# Patient Record
Sex: FEMALE | Race: WHITE
[De-identification: names, ages, dates, MRNs, and addresses within clinical notes are randomized per-mention and may not be internally consistent; named-entity substitution may affect disease eponyms.]

---

## 2018-04-01 VITALS — SYSTOLIC BLOOD PRESSURE: 127 MMHG | DIASTOLIC BLOOD PRESSURE: 68 MMHG

## 2018-04-02 ENCOUNTER — HOSPITAL ENCOUNTER (INPATIENT)
Dept: HOSPITAL 96 - M.ERS | Age: 58
LOS: 4 days | Discharge: HOME HEALTH | DRG: 871 | End: 2018-04-06
Attending: INTERNAL MEDICINE | Admitting: INTERNAL MEDICINE
Payer: COMMERCIAL

## 2018-04-02 VITALS — SYSTOLIC BLOOD PRESSURE: 101 MMHG | DIASTOLIC BLOOD PRESSURE: 50 MMHG

## 2018-04-02 VITALS — DIASTOLIC BLOOD PRESSURE: 47 MMHG | SYSTOLIC BLOOD PRESSURE: 86 MMHG

## 2018-04-02 VITALS — SYSTOLIC BLOOD PRESSURE: 89 MMHG | DIASTOLIC BLOOD PRESSURE: 34 MMHG

## 2018-04-02 VITALS — BODY MASS INDEX: 41.95 KG/M2 | HEIGHT: 70 IN | WEIGHT: 293 LBS

## 2018-04-02 VITALS — SYSTOLIC BLOOD PRESSURE: 124 MMHG | DIASTOLIC BLOOD PRESSURE: 60 MMHG

## 2018-04-02 VITALS — DIASTOLIC BLOOD PRESSURE: 68 MMHG | SYSTOLIC BLOOD PRESSURE: 127 MMHG

## 2018-04-02 VITALS — DIASTOLIC BLOOD PRESSURE: 87 MMHG | SYSTOLIC BLOOD PRESSURE: 113 MMHG

## 2018-04-02 DIAGNOSIS — N39.0: ICD-10-CM

## 2018-04-02 DIAGNOSIS — M25.562: ICD-10-CM

## 2018-04-02 DIAGNOSIS — R33.9: ICD-10-CM

## 2018-04-02 DIAGNOSIS — E87.1: ICD-10-CM

## 2018-04-02 DIAGNOSIS — E03.9: ICD-10-CM

## 2018-04-02 DIAGNOSIS — K75.9: ICD-10-CM

## 2018-04-02 DIAGNOSIS — A41.9: Primary | ICD-10-CM

## 2018-04-02 DIAGNOSIS — Z91.048: ICD-10-CM

## 2018-04-02 DIAGNOSIS — Z98.84: ICD-10-CM

## 2018-04-02 DIAGNOSIS — E11.22: ICD-10-CM

## 2018-04-02 DIAGNOSIS — I12.0: ICD-10-CM

## 2018-04-02 DIAGNOSIS — M79.7: ICD-10-CM

## 2018-04-02 DIAGNOSIS — Z79.899: ICD-10-CM

## 2018-04-02 DIAGNOSIS — E87.6: ICD-10-CM

## 2018-04-02 DIAGNOSIS — Z88.2: ICD-10-CM

## 2018-04-02 DIAGNOSIS — E11.65: ICD-10-CM

## 2018-04-02 DIAGNOSIS — N18.5: ICD-10-CM

## 2018-04-02 DIAGNOSIS — E66.01: ICD-10-CM

## 2018-04-02 DIAGNOSIS — Z79.82: ICD-10-CM

## 2018-04-02 DIAGNOSIS — G47.33: ICD-10-CM

## 2018-04-02 DIAGNOSIS — E78.5: ICD-10-CM

## 2018-04-02 DIAGNOSIS — F31.9: ICD-10-CM

## 2018-04-02 DIAGNOSIS — J18.9: ICD-10-CM

## 2018-04-02 DIAGNOSIS — D64.9: ICD-10-CM

## 2018-04-02 DIAGNOSIS — I42.9: ICD-10-CM

## 2018-04-02 DIAGNOSIS — F41.9: ICD-10-CM

## 2018-04-02 DIAGNOSIS — N17.0: ICD-10-CM

## 2018-04-02 DIAGNOSIS — J45.909: ICD-10-CM

## 2018-04-02 DIAGNOSIS — E44.0: ICD-10-CM

## 2018-04-02 LAB
%HYPO/RBC NFR BLD AUTO: (no result) %
ABSOLUTE MONOCYTES: 0.7 THOU/UL (ref 0–1.2)
ALBUMIN SERPL-MCNC: 2.3 G/DL (ref 3.4–5)
ALP SERPL-CCNC: 66 U/L (ref 46–116)
ALT SERPL-CCNC: 50 U/L (ref 30–65)
ANION GAP SERPL CALC-SCNC: 12 MMOL/L (ref 7–16)
ANISOCYTOSIS BLD QL SMEAR: (no result)
APTT BLD: 33.9 SECONDS (ref 25–31.3)
AST SERPL-CCNC: 137 U/L (ref 15–37)
BACTERIA-REFLEX: (no result) /HPF
BILIRUB SERPL-MCNC: 0.5 MG/DL
BILIRUB UR-MCNC: NEGATIVE MG/DL
BUN SERPL-MCNC: 48 MG/DL (ref 7–18)
CALCIUM SERPL-MCNC: 8.2 MG/DL (ref 8.5–10.1)
CHLORIDE SERPL-SCNC: 92 MMOL/L (ref 98–107)
CO2 SERPL-SCNC: 26 MMOL/L (ref 21–32)
COLOR UR: YELLOW
CREAT SERPL-MCNC: 3.4 MG/DL (ref 0.6–1.3)
GLUCOSE SERPL-MCNC: 127 MG/DL (ref 70–99)
GRANULOCYTES NFR BLD MANUAL: 92 %
HCT VFR BLD CALC: 31.9 % (ref 37–47)
HGB BLD-MCNC: 10 GM/DL (ref 12–15)
INR PPP: 1.1
KETONES UR STRIP-MCNC: NEGATIVE MG/DL
LYMPHOCYTES # BLD: 0.7 THOU/UL (ref 0.8–5.3)
LYMPHOCYTES NFR BLD AUTO: 4 %
MCH RBC QN AUTO: 22.6 PG (ref 26–34)
MCHC RBC AUTO-ENTMCNC: 31.4 G/DL (ref 28–37)
MCV RBC: 71.9 FL (ref 80–100)
MICROCYTES: (no result)
MONOCYTES NFR BLD: 4 %
MPV: 8.3 FL. (ref 7.2–11.1)
NEUTROPHILS # BLD: 15.4 THOU/UL (ref 1.6–8.1)
NUCLEATED RBCS: 0 /100WBC
PLATELET # BLD EST: ADEQUATE 10*3/UL
PLATELET COUNT*: 224 THOU/UL (ref 150–400)
POTASSIUM SERPL-SCNC: 3.3 MMOL/L (ref 3.5–5.1)
PROT SERPL-MCNC: 6.9 G/DL (ref 6.4–8.2)
PROT UR QL STRIP: (no result)
PROTHROMBIN TIME: 10.4 SECONDS (ref 9.2–11.5)
RBC # BLD AUTO: 4.44 MIL/UL (ref 4.2–5)
RBC # UR STRIP: (no result) /UL
RBC #/AREA URNS HPF: (no result) /HPF (ref 0–2)
RDW-CV: 17.1 % (ref 10.5–14.5)
SODIUM SERPL-SCNC: 130 MMOL/L (ref 136–145)
SP GR UR STRIP: 1.02 (ref 1–1.03)
SQUAMOUS: (no result) /LPF (ref 0–3)
URINE CLARITY: (no result)
URINE GLUCOSE-RANDOM: NEGATIVE
URINE LEUKOCYTES-REFLEX: (no result)
URINE NITRITE-REFLEX: POSITIVE
UROBILINOGEN UR STRIP-ACNC: 0.2 E.U./DL (ref 0.2–1)
WBC # BLD AUTO: 16.7 THOU/UL (ref 4–11)

## 2018-04-02 PROCEDURE — 02H633Z INSERTION OF INFUSION DEVICE INTO RIGHT ATRIUM, PERCUTANEOUS APPROACH: ICD-10-PCS | Performed by: INTERNAL MEDICINE

## 2018-04-02 NOTE — E[place] [address]
Phone:  [phone]                     ELECTROCARDIOGRAM REPORT      
_______________________________________________________________________________
 
Name:       BRITTNEY SLADE                Room:                      [facility]  
M.R.#:  [id number]      Account #:      [id number]  
Admission:  18     Attend Phys:                         
Discharge:               Date of Birth:  [birth year]  
         Report #: [id number]
    [id number]
_______________________________________________________________________________
THIS REPORT FOR:  //name//                      
 
                         [facility] ED
                                       
Test Date:    2018               Test Time:    14:33:10
Pat Name:     BRITTNEY SLADE            Department:   
Patient ID:   [id number]            Room:          
Gender:       F                        Technician:   EVELIO THURMAN
:          1960               Requested By: Twin Chen
Order Number: [id number]    Reading MD:   Gilbert Cortez
                                 Measurements
Intervals                              Axis          
Rate:         121                      P:            
NH:                                    QRS:          17
QRSD:         94                       T:            -6
QT:           343                                    
QTc:          487                                    
                           Interpretive Statements
sinus tachycardia
Borderline T abnormalities, inferior leads
Borderline prolonged QT interval
No previous ECG available for comparison
 
Electronically Signed On 2018 15:31:56 CDT by Gilbert Cortez
[email or web address]
 
 
 
 
 
 
 
 
 
 
 
 
 
 
 
 
 
 
  <ELECTRONICALLY SIGNED>
                                           By: Gilbert Cortez MD, [facility]      
  18     1531
D: 18 1433   _____________________________________
T: 18 1433   Gilbert Cortez MD, FACC        /EPI

## 2018-04-02 NOTE — NUR
PT ARRIVED ON THE UNIT AT 1825. REPORT GIVEN TO SHAHLA OTTO. ASSESSED PT AND
DOCUMENTED. PT IS A&O AND ON 4L OF 02. PT IS ON FALL PRECAUTIONS PER FACILITY
PROTOCOL. PT C/O PAIN RATED A 7 IN HER BACK. BED IS IN LOW POSITION CALL LIGHT
IS IN REACH. FALL CONTRACT AND PT MEDICARE RIGHTS SIGNED.

## 2018-04-02 NOTE — NUR
PT UNABLE TO VOID ON BED PAN. JARAMILLO CATHETER PLACED. 1400 MLS OF DRK CLOUDY
URINE. UA COLLECTED AND SENT TO LAB.

## 2018-04-03 VITALS — DIASTOLIC BLOOD PRESSURE: 46 MMHG | SYSTOLIC BLOOD PRESSURE: 110 MMHG

## 2018-04-03 VITALS — SYSTOLIC BLOOD PRESSURE: 114 MMHG | DIASTOLIC BLOOD PRESSURE: 60 MMHG

## 2018-04-03 VITALS — DIASTOLIC BLOOD PRESSURE: 77 MMHG | SYSTOLIC BLOOD PRESSURE: 212 MMHG

## 2018-04-03 VITALS — SYSTOLIC BLOOD PRESSURE: 127 MMHG | DIASTOLIC BLOOD PRESSURE: 78 MMHG

## 2018-04-03 VITALS — SYSTOLIC BLOOD PRESSURE: 113 MMHG | DIASTOLIC BLOOD PRESSURE: 57 MMHG

## 2018-04-03 VITALS — SYSTOLIC BLOOD PRESSURE: 119 MMHG | DIASTOLIC BLOOD PRESSURE: 64 MMHG

## 2018-04-03 VITALS — SYSTOLIC BLOOD PRESSURE: 121 MMHG | DIASTOLIC BLOOD PRESSURE: 59 MMHG

## 2018-04-03 VITALS — SYSTOLIC BLOOD PRESSURE: 149 MMHG | DIASTOLIC BLOOD PRESSURE: 78 MMHG

## 2018-04-03 VITALS — DIASTOLIC BLOOD PRESSURE: 47 MMHG | SYSTOLIC BLOOD PRESSURE: 107 MMHG

## 2018-04-03 VITALS — SYSTOLIC BLOOD PRESSURE: 102 MMHG | DIASTOLIC BLOOD PRESSURE: 84 MMHG

## 2018-04-03 VITALS — SYSTOLIC BLOOD PRESSURE: 122 MMHG | DIASTOLIC BLOOD PRESSURE: 75 MMHG

## 2018-04-03 VITALS — DIASTOLIC BLOOD PRESSURE: 78 MMHG | SYSTOLIC BLOOD PRESSURE: 154 MMHG

## 2018-04-03 VITALS — DIASTOLIC BLOOD PRESSURE: 58 MMHG | SYSTOLIC BLOOD PRESSURE: 120 MMHG

## 2018-04-03 VITALS — DIASTOLIC BLOOD PRESSURE: 79 MMHG | SYSTOLIC BLOOD PRESSURE: 146 MMHG

## 2018-04-03 VITALS — DIASTOLIC BLOOD PRESSURE: 64 MMHG | SYSTOLIC BLOOD PRESSURE: 111 MMHG

## 2018-04-03 VITALS — DIASTOLIC BLOOD PRESSURE: 62 MMHG | SYSTOLIC BLOOD PRESSURE: 83 MMHG

## 2018-04-03 VITALS — DIASTOLIC BLOOD PRESSURE: 66 MMHG | SYSTOLIC BLOOD PRESSURE: 131 MMHG

## 2018-04-03 VITALS — DIASTOLIC BLOOD PRESSURE: 70 MMHG | SYSTOLIC BLOOD PRESSURE: 121 MMHG

## 2018-04-03 LAB
ABSOLUTE BASOPHILS: 0.1 THOU/UL (ref 0–0.2)
ABSOLUTE EOSINOPHILS: 0 THOU/UL (ref 0–0.7)
ABSOLUTE MONOCYTES: 1.9 THOU/UL (ref 0–1.2)
ALBUMIN SERPL-MCNC: 2.1 G/DL (ref 3.4–5)
ALP SERPL-CCNC: 59 U/L (ref 46–116)
ALT SERPL-CCNC: 49 U/L (ref 30–65)
ANION GAP SERPL CALC-SCNC: 11 MMOL/L (ref 7–16)
AST SERPL-CCNC: 113 U/L (ref 15–37)
BASOPHILS NFR BLD AUTO: 0.3 %
BILIRUB SERPL-MCNC: 0.3 MG/DL
BUN SERPL-MCNC: 46 MG/DL (ref 7–18)
CALCIUM SERPL-MCNC: 7.6 MG/DL (ref 8.5–10.1)
CHLORIDE SERPL-SCNC: 98 MMOL/L (ref 98–107)
CO2 SERPL-SCNC: 26 MMOL/L (ref 21–32)
CREAT SERPL-MCNC: 2.8 MG/DL (ref 0.6–1.3)
EOSINOPHIL NFR BLD: 0.1 %
GLUCOSE SERPL-MCNC: 119 MG/DL (ref 70–99)
GRANULOCYTES NFR BLD MANUAL: 80.9 %
HCT VFR BLD CALC: 28.9 % (ref 37–47)
HGB BLD-MCNC: 9.3 GM/DL (ref 12–15)
LYMPHOCYTES # BLD: 1.1 THOU/UL (ref 0.8–5.3)
LYMPHOCYTES NFR BLD AUTO: 6.8 %
MAGNESIUM SERPL-MCNC: 1.6 MG/DL (ref 1.8–2.4)
MCH RBC QN AUTO: 22.8 PG (ref 26–34)
MCHC RBC AUTO-ENTMCNC: 32 G/DL (ref 28–37)
MCV RBC: 71.3 FL (ref 80–100)
MONOCYTES NFR BLD: 11.9 %
MPV: 8.7 FL. (ref 7.2–11.1)
NEUTROPHILS # BLD: 13.1 THOU/UL (ref 1.6–8.1)
NUCLEATED RBCS: 0 /100WBC
PLATELET COUNT*: 220 THOU/UL (ref 150–400)
POTASSIUM SERPL-SCNC: 3.5 MMOL/L (ref 3.5–5.1)
PROT SERPL-MCNC: 6.5 G/DL (ref 6.4–8.2)
RBC # BLD AUTO: 4.05 MIL/UL (ref 4.2–5)
RDW-CV: 17.1 % (ref 10.5–14.5)
SODIUM SERPL-SCNC: 135 MMOL/L (ref 136–145)
WBC # BLD AUTO: 16.2 THOU/UL (ref 4–11)

## 2018-04-03 NOTE — NUR
Nutrition: Pt admitted with sepsis, pneumonia. H/o DM, HTN, HLD, OBE. Wt:
450#. Ht: 5'10" On CHO controlled diet. Per ICU rounds, pt didn't eat much
BKFST d/t HA. Labs: , alb 2.1, prealb 9. Expect increase in po intake
throughout the day. If po intake remains <50% of meals, will order Boost
Glucose Control for added protein and kcals. Consider Mild risk. Will follow
up on po intake and labs 4/5/18.

## 2018-04-03 NOTE — NUR
CM SPOKE TO THE RN IN-CHARGE OF THE PATIENT AND SHE INFORMS THAT THE PATIENT
HAS COMPLAINED OF A HEADACHE TODAY, BUT REFUSED MEDICATION. PATIENT CURENLTLY
ON 4L O2. PATIENT REFUSED BREAKFAST. CM WILL REMAIN AVAILABLE TO ASSIST AND
FOLLOW AS NEEDED.

## 2018-04-03 NOTE — NUR
PT ALERT ORIENTED. UA + FOR UTI. DR MADISON NOTIFIED VIA YOU CALL MD. NO NEW
ORDERS AT THIS TIME. LEVOPHED AT 5 MCG. TITRATED BETWEEN 4-6 MCG SINCE 1930.
SBP LOW 100S. NS GIVEN TIMES 3 LITERS THEN 1 LITER AT 150MLS/HR. THEN TKO. PT
TURNS SELF. REFUSED SCDS AND BARIATRIC BED.  TELEMETRY SHOWS SR.

## 2018-04-03 NOTE — NUR
PT AOX4 THIS SHIFT
PT MAINTAINING BP POST LEVO GTT BEING STOPPED
PT O2 SATS IN THE 90'S THIS SHIFT ON 4L O2 PER NC
PT HAS JARAMILLO WITH NO COMPLICATIONS AND ADEQUATE OUTPUT THIS SHIFT
PT SKIN INTACT THIS SHIFT, NO GI/ COMPLAINTS THIS SHIFT
PT TOLERATING DIET THIS SHIFT
PT C/O PAIN THIS SHIFT, APPROPRIATELY ASKS FOR PAIN MEDICATIONS.
HOURLY ROUNDING HAS BEEN MAINTAINED THIS SHIFT

## 2018-04-04 VITALS — SYSTOLIC BLOOD PRESSURE: 149 MMHG | DIASTOLIC BLOOD PRESSURE: 67 MMHG

## 2018-04-04 VITALS — SYSTOLIC BLOOD PRESSURE: 120 MMHG | DIASTOLIC BLOOD PRESSURE: 67 MMHG

## 2018-04-04 VITALS — DIASTOLIC BLOOD PRESSURE: 84 MMHG | SYSTOLIC BLOOD PRESSURE: 125 MMHG

## 2018-04-04 VITALS — SYSTOLIC BLOOD PRESSURE: 161 MMHG | DIASTOLIC BLOOD PRESSURE: 60 MMHG

## 2018-04-04 VITALS — DIASTOLIC BLOOD PRESSURE: 67 MMHG | SYSTOLIC BLOOD PRESSURE: 109 MMHG

## 2018-04-04 VITALS — DIASTOLIC BLOOD PRESSURE: 69 MMHG | SYSTOLIC BLOOD PRESSURE: 102 MMHG

## 2018-04-04 VITALS — SYSTOLIC BLOOD PRESSURE: 108 MMHG | DIASTOLIC BLOOD PRESSURE: 70 MMHG

## 2018-04-04 VITALS — SYSTOLIC BLOOD PRESSURE: 103 MMHG | DIASTOLIC BLOOD PRESSURE: 47 MMHG

## 2018-04-04 VITALS — DIASTOLIC BLOOD PRESSURE: 96 MMHG | SYSTOLIC BLOOD PRESSURE: 120 MMHG

## 2018-04-04 VITALS — SYSTOLIC BLOOD PRESSURE: 122 MMHG | DIASTOLIC BLOOD PRESSURE: 68 MMHG

## 2018-04-04 VITALS — DIASTOLIC BLOOD PRESSURE: 59 MMHG | SYSTOLIC BLOOD PRESSURE: 102 MMHG

## 2018-04-04 VITALS — SYSTOLIC BLOOD PRESSURE: 105 MMHG | DIASTOLIC BLOOD PRESSURE: 43 MMHG

## 2018-04-04 VITALS — SYSTOLIC BLOOD PRESSURE: 131 MMHG | DIASTOLIC BLOOD PRESSURE: 66 MMHG

## 2018-04-04 VITALS — DIASTOLIC BLOOD PRESSURE: 66 MMHG | SYSTOLIC BLOOD PRESSURE: 128 MMHG

## 2018-04-04 VITALS — DIASTOLIC BLOOD PRESSURE: 59 MMHG | SYSTOLIC BLOOD PRESSURE: 116 MMHG

## 2018-04-04 VITALS — DIASTOLIC BLOOD PRESSURE: 66 MMHG | SYSTOLIC BLOOD PRESSURE: 131 MMHG

## 2018-04-04 LAB
ABSOLUTE BASOPHILS: 0.1 THOU/UL (ref 0–0.2)
ABSOLUTE EOSINOPHILS: 0.1 THOU/UL (ref 0–0.7)
ABSOLUTE MONOCYTES: 1.4 THOU/UL (ref 0–1.2)
ALBUMIN SERPL-MCNC: 2 G/DL (ref 3.4–5)
ALBUMIN SERPL-MCNC: 2 G/DL (ref 3.4–5)
ALP SERPL-CCNC: 58 U/L (ref 46–116)
ALP SERPL-CCNC: 59 U/L (ref 46–116)
ALT SERPL-CCNC: 56 U/L (ref 30–65)
ALT SERPL-CCNC: 58 U/L (ref 30–65)
ANION GAP SERPL CALC-SCNC: 6 MMOL/L (ref 7–16)
ANION GAP SERPL CALC-SCNC: 7 MMOL/L (ref 7–16)
AST SERPL-CCNC: 90 U/L (ref 15–37)
AST SERPL-CCNC: 91 U/L (ref 15–37)
BASOPHILS NFR BLD AUTO: 1 %
BILIRUB SERPL-MCNC: 0.2 MG/DL
BILIRUB SERPL-MCNC: 0.2 MG/DL
BUN SERPL-MCNC: 28 MG/DL (ref 7–18)
BUN SERPL-MCNC: 29 MG/DL (ref 7–18)
CALCIUM SERPL-MCNC: 8.1 MG/DL (ref 8.5–10.1)
CALCIUM SERPL-MCNC: 8.1 MG/DL (ref 8.5–10.1)
CHLORIDE SERPL-SCNC: 101 MMOL/L (ref 98–107)
CHLORIDE SERPL-SCNC: 101 MMOL/L (ref 98–107)
CO2 SERPL-SCNC: 28 MMOL/L (ref 21–32)
CO2 SERPL-SCNC: 29 MMOL/L (ref 21–32)
CREAT SERPL-MCNC: 1.6 MG/DL (ref 0.6–1.3)
CREAT SERPL-MCNC: 1.6 MG/DL (ref 0.6–1.3)
EOSINOPHIL NFR BLD: 1 %
GLUCOSE SERPL-MCNC: 112 MG/DL (ref 70–99)
GLUCOSE SERPL-MCNC: 116 MG/DL (ref 70–99)
GRANULOCYTES NFR BLD MANUAL: 75.6 %
HCT VFR BLD CALC: 28.3 % (ref 37–47)
HGB BLD-MCNC: 8.9 GM/DL (ref 12–15)
LYMPHOCYTES # BLD: 1.2 THOU/UL (ref 0.8–5.3)
LYMPHOCYTES NFR BLD AUTO: 10.3 %
MCH RBC QN AUTO: 22.6 PG (ref 26–34)
MCHC RBC AUTO-ENTMCNC: 31.4 G/DL (ref 28–37)
MCV RBC: 72.1 FL (ref 80–100)
MONOCYTES NFR BLD: 12.1 %
MPV: 8.3 FL. (ref 7.2–11.1)
NEUTROPHILS # BLD: 8.6 THOU/UL (ref 1.6–8.1)
NUCLEATED RBCS: 0 /100WBC
PLATELET COUNT*: 239 THOU/UL (ref 150–400)
POTASSIUM SERPL-SCNC: 3.8 MMOL/L (ref 3.5–5.1)
POTASSIUM SERPL-SCNC: 3.8 MMOL/L (ref 3.5–5.1)
PROT SERPL-MCNC: 6.6 G/DL (ref 6.4–8.2)
PROT SERPL-MCNC: 6.6 G/DL (ref 6.4–8.2)
RBC # BLD AUTO: 3.93 MIL/UL (ref 4.2–5)
RDW-CV: 17.3 % (ref 10.5–14.5)
SODIUM SERPL-SCNC: 136 MMOL/L (ref 136–145)
SODIUM SERPL-SCNC: 136 MMOL/L (ref 136–145)
WBC # BLD AUTO: 11.4 THOU/UL (ref 4–11)

## 2018-04-04 PROCEDURE — B24BZZ4 ULTRASONOGRAPHY OF HEART WITH AORTA, TRANSESOPHAGEAL: ICD-10-PCS | Performed by: INTERNAL MEDICINE

## 2018-04-04 NOTE — NUR
PT'S HEART RATE INCREASED -150 FOR LESS THAN ONE MINUTE. CALLED DR NARAYANAN AND REPORTED HEART RATE. PT NOT TO BE TRANSFERED OUT OF ICU, CONTINUE TO
MONITOR PER DR NARAYANAN.

## 2018-04-04 NOTE — NUR
INTERDISICPLINARY ROUNDS: MET WITH PT. SHE WAS A/O.  ON O2 AND HAD EPISODE OF
TACHYCARDIA PER NURSING THIS AM. PT STATES SHE LIVES WITH HER COUSIN/ROCKY MORALES. THEY SHARE HOUSEHOLD DUTIES. PT'S SPOUSE IS .  PT HAS A
BROTHER/DAMARIS LOCKHART WHO IS AN  THAT SHE SAYS IS HER 1ST
DPOA. ONLY DPOA ON FILE IS FROM , PT STATES THERE IS A NEWER ONE SINCE
THEN AND SHE WILL HAVE ROCKY BRING IT IN.  PT STATES HER BROTHER DAMARIS LOCKHART
[phone] AND HER COUSIN FRANCISCO JAVIER MORALES [phone] ARE HER DPOAS.  PT IS
INDEPENDENT AT HOME, DRIVES.  HAS WALKER AND ONE WITH A SEAT, CANE AND W/C AT
HOME SHE USES AS NEEDED.  SHE HASN'T HAD HH OR BEEN TO SNF. PT PLANS TO GO
[place].  WILL FOLLOW

## 2018-04-04 NOTE — CON
[address]  [address]                    CONSULTATION                  
_______________________________________________________________________________
 
Name:       YANYBRITTNEY JOVI                Room:           [address] [place]  
M.R.#:  [id number]      Account #:      [id number]  
Admission:  04/02/18     Attend Phys:    INEZ Zuniga
Discharge:               Date of Birth:  [birth year]  
         Report #: [id number]
                                                                     [id number]  
_______________________________________________________________________________
THIS REPORT FOR:  //name//                      
 
CC: Gilbert Lutz
 
DATE OF SERVICE:  04/03/2018
 
 
ATTENDING PHYSICIAN:  Dr. Reno Lutz.
 
REASON FOR EVALUATION:  Sepsis, pneumonia.
 
HISTORY OF PRESENT ILLNESS:  Chart reviewed, patient examined.  This 58-year-old
morbidly obese woman with diabetes mellitus type 2 who apparently had onset of
illness, perhaps 10-11 days ago.  She described progressive dyspnea, not a
significant amount of cough, did have high-grade temperature elevations in the
102-103 range for several days, became weaker and notes some anorexia and poor
p.o. intake.  She was getting out of bed overnight prior to admission, fell on
the floor, dragged herself to the commode; however, was unable to get up.  She
was there for several hours until somebody found her.  She was transferred to
the Emergency Room, is now in the Intensive Care Unit.  She is generally lucid. 
She is on supplemental oxygen and supportive pressor with norepinephrine.  She
has been started on broad-spectrum antimicrobial therapy including levofloxacin
as well as ceftriaxone and azithromycin as well.  Cultures of the urine and
blood are in progress.  She is currently afebrile.
 
ALLERGIES:  SULFA.
 
CURRENT MEDICATIONS:  Include ipratropium and albuterol inhaler, levofloxacin,
gabapentin, aspirin, levothyroxine, p.r.n. analgesics, antiemetics.
 
PAST MEDICAL HISTORY:  In addition to diabetes mellitus type 2, hypertension,
morbid obesity, anxiety, asthma, bipolar disease, depression, fibromyalgia,
hyperlipidemia, hypothyroidism, previous lap band surgery in 2005.
 
SOCIAL HISTORY:  Nonsmoker, no ethanol.
 
FAMILY HISTORY:  Noncontributory.
 
REVIEW OF SYSTEMS:  As above.  No significant abdominal related discomfort.  She
has had some constipation.
 
PHYSICAL EXAMINATION:
GENERAL:  She is somewhat lethargic.  She does arouse.  She makes eye contact. 
She tracks, is in moderate distress.  She has nasal cannula oxygen in place.
VITAL SIGNS:  Temperature 98.5, pulse 100, respirations 17, blood pressure
 
 
 
[place] [address]                    CONSULTATION                  
_______________________________________________________________________________
 
Name:       BRITTNEY SLADE                Room:           [address]#:  [id number]      Account #:      [id number]  
Admission:  04/02/18     Attend Phys:    INEZ Zuniga
Discharge:               Date of Birth:  [birth year]  
         Report #: [id number]
                                                                     [id number]  
_______________________________________________________________________________
110/46, saturation is 93%.
SKIN:  Warm, dry, no rashes.
HEENT:  Unremarkable.
NECK:  Supple.
LUNGS:  Distant, few scattered coarse breath sounds.
HEART:  Distant as well.  Regular, borderline tachycardic.
ABDOMEN:  Obese, soft.  There are no peritoneal signs.
GENITOURINARY:  Deferred.
RECTAL:  Deferred.
 
LABORATORY DATA:  Initial PT 10.4, INR 1.1.  Electrolytes:  Sodium 130,
potassium 3.3, chloride 92, bicarbonate is 26, BUN and creatinine 48 and 3.4,
glucose of 127.  AST of 137, ALT of 50.  Estimated GFR 14.  Albumin 2.3, total
protein of 6.9.  Lactic acid 3.0 initially, repeat was 1.5.  Chest x-ray:  Mild
patchy bibasilar atelectasis, pneumonitis.  CBC:  White count of 16.7, H and H
10.0 and 31.9, platelets of 224.  Influenza antigen was not detected for A or B.
 Urinalysis 16-25 white cells, 1-9 bacteria.  Blood cultures sterile thus far.
 
ASSESSMENT:  Pneumonitis, perhaps secondary bacterial pneumonia in the setting
of a previous viral respiratory tract infection.  It is reasonable to continue
empiric antimicrobials.  We will adjust therapy.  She is certainly at risk for
infectious complications while residing in the hospital as well.  We will have
to monitor expectantly.
 
 
 
 
 
 
 
 
 
 
 
 
 
 
 
 
 
 
 
 
 
<ELECTRONICALLY SIGNED>
                                        By:  Oumar Bridges MD           
04/04/18     1232
D: 04/03/18 1032_______________________________________
T: 04/03/18 1202Jolily Bridges MD              /nt

## 2018-04-04 NOTE — NUR
PT RANG LIGHT AND REPORTED BLADDER PAIN. CATHETER BAG APPEARED FULL. EMPTIED
JARAMILLO BAG AND BLADDER SCAN DONE BY LESLIE MCKEON RN. JARAMILLO IRRIGATED AND HAD
GOOD RETURN. PT REPORTED FEELING RELIEF.

## 2018-04-04 NOTE — NUR
PT WAS NOTED TO HAVE 'S 'S. PT WAS GUIDED THROUGH VAGAL MANEUVERS
WITHOUT ANY CHANGE IN HEART RATE. EKG ORDERED AND PT CONVERTED BACK TO ST WITH
. PT APPEARED TO BE MILDLY AGITATED DURING INCREASED HEART RATE BUT
DENIED CHEST PAIN, SOA, OR ANY OTHER DISCOMFORTS. PT DENIED FEELING ANY
DIFFERENCE IN HEART RATE. PT NOW RESTING IN BED WITH EYES CLOSED, APPEARS TO
BE COMFORTABLE, DENIES ANY NEEDS.

## 2018-04-04 NOTE — EKG
[place] [address]
Phone:  [phone]                     ELECTROCARDIOGRAM REPORT      
_______________________________________________________________________________
 
Name:       BRITTNEY SLADE                Room:           [address]    ADM [place]  
M.R.#:  [id number]      Account #:      [id number]  
Admission:  18     Attend Phys:    INEZ Zuniga
Discharge:               Date of Birth:  [birth year]  
         Report #: [id number]
    [id number]
_______________________________________________________________________________
THIS REPORT FOR:  //name//                      
 
                          [facility]
                                       
Test Date:    2018               Test Time:    09:52:17
Pat Name:     BRITTNEY SLADE            Department:   
Patient ID:   [id number]            Room:         [address]
Gender:       F                        Technician:   
:          1960               Requested By: Siddhartha French
Order Number: [id number]    Anastacia MD:   Gilbert Cortez
                                 Measurements
Intervals                              Axis          
Rate:         104                      P:            39
RI:           170                      QRS:          13
QRSD:         109                      T:            10
QT:           338                                    
QTc:          445                                    
                           Interpretive Statements
Sinus tachycardia
Compared to ECG 2018 14:33:10
T-wave abnormality no longer present
 
Electronically Signed On 2018 10:19:15 CDT by Gilbert Cortez
[email or web address]
 
 
 
 
 
 
 
 
 
 
 
 
 
 
 
 
 
 
 
  <ELECTRONICALLY SIGNED>
                                           By: Gilbert Cortez MD, [facility]      
  18     1019
D: 1852   _____________________________________
T: 18   Gilbert Cortez MD, FACC        /EPI

## 2018-04-04 NOTE — NUR
Nutrition: follow up. Pt is eating better, per ICU rounds. Remains low to mild
nutrition risk. Will follow up per protocol.

## 2018-04-04 NOTE — NUR
at 2130 pt reported chest pain. 12 lead Ekg done. result called to dr Collazo. recieved order for PRN nitro. at 2145 pt reported relief after
resting, no nitro given. pt resting with ou closed at this time.

## 2018-04-04 NOTE — NUR
PT RANG CALL LIGHT FOR ASSISTANCE BACK TO BED. PT ON COMMODE AT THAT TIME.
PT UNABLE TO STAND AND FELL TO KNEES. PT REPORTS PAIN IN LEFT KNEE. PT GIVEN
PRN NORCO. NURSING SUPERVISOR CHERELLE PENA NOTIFIED. DR TOSCANO NOTIFIED.
RECIEVED ORDER FOR XRAY OF LEFT KNEE.

## 2018-04-04 NOTE — 2DMMODE
[place] [address]
Phone:  [phone] 2 D/M-MODE ECHOCARDIOGRAM     
_______________________________________________________________________________
 
Name:         BRITTNEY SLADE               Room:          [address] [place] 
[facility]#:    [id number]     Account #:     [id number]  
Admission:    18    Attend Phys:   Reno Lutz
Discharge:                Date of Birth: [birth year]  
Date of Service: 18 1440  Report #:      [id number]
        [id number]
_______________________________________________________________________________
THIS REPORT FOR:  //name//                      
 
 
--------------- APPROVED REPORT --------------
 
 
Study performed:  2018 10:57:33
 
EXAM: Comprehensive 2D, Doppler, and color-flow 
Echocardiogram 
Patient Location: In-Patient   
Room #:  001     Status:  routine
 
     BSA:         3.03
HR: 101 bpm BP:          87/28 mmHg 
Rhythm: NSR    
 
Other Information 
Technically limited study due to  body habitus.
 
Indications
Dyspnea 
 
Echo Enhancing Agent
Indication: Endocardial border delineation
Agent(s) / Amount(s) Used: Optison 3 cc
 
2D Dimensions
   LVEF(%):  65.79 (&gt;50%)
IVSd:  15.75 (7-11mm) LVOT Diam:  21.73 (18-24mm) 
LVDd:  49.19 mm  
PWd:  15.50 (7-11mm) Ascending Ao:  32.45 (22-36mm)
LVDs:  31.34 (25-40mm) 
Aortic Root:  38.74 mm 
   Abdi's LVEF:  65.79 %
 
Volumes
Left Atrial Volume (Systole) 
    LA ESV Index:  28.90 mL/m2
 
Aortic Valve
AoV Peak Isidro.:  1.67 m/s 
AO Peak Gr.:  11.11 mmHg LVOT Max P.22 mmHg
AO Mean Gr.:  6.45 mmHg  LVOT Mean PG:  3.72 mmHg
    LVOT Max V:  1.34 m/s
AO V2 VTI:  29.73 cm  LVOT Mean V:  0.89 m/s
 
 
[place] [address]
Phone:  [phone]                     2 D/M-MODE ECHOCARDIOGRAM     
_______________________________________________________________________________
 
Name:         BRITTNEY SLADE               Room:          [address] [place] 
[facility].#:    [id number]     Account #:     [id number]  
Admission:    18    Attend Phys:   Reno Lutz
Discharge:                Date of Birth: [birth year]  
Date of Service: 18 1440  Report #:      [id number]
        [id number]
_______________________________________________________________________________
SRAVANI (VTI):  3.02 cm2  LVOT V1 VTI:  24.21 cm
 
Mitral Valve
    E/A Ratio:  1.10
    MV Decel. Time:  186.22 ms
MV E Max Isidro.:  1.24 m/s 
MV PHT:  54.00 ms  
MVA (PHT):  4.07 cm2  
 
TDI
E/Lateral E':  9.54 
   Lateral E' Isidro.:  0.13 m/s
 
Pulmonary Valve
PV Peak Isidro.:  1.30 m/s PV Peak Gr.:  6.81 mmHg
 
Left Ventricle
The left ventricle is normal size. There is normal LV segmental wall 
motion. Moderate concentric left ventricular hypertrophy. Left 
ventricular systolic function is normal. The left ventricular 
ejection fraction is within the normal range. LVEF is 60-65%. The 
left ventricular diastolic function is normal.
 
Right Ventricle
The right ventricle is normal size. The right ventricular systolic 
function is normal.
 
Atria
The left atrium size is normal. The right atrium size is 
normal.
 
Aortic Valve
The aortic valve is normal in structure. No aortic regurgitation is 
present. There is no aortic valvular stenosis.
 
Mitral Valve
The mitral valve is normal in structure. There is no mitral valve 
regurgitation noted. No evidence of mitral valve stenosis.
 
Tricuspid Valve
The tricuspid valve is normal in structure. Unable to assess PA 
pressure. Trace tricuspid regurgitation.
 
Pulmonic Valve
The pulmonary valve is normal in structure. There is no pulmonic 
valvular regurgitation.
 
 
[place] [address]
Phone:  [phone]                     2 D/M-MODE ECHOCARDIOGRAM     
_______________________________________________________________________________
 
Name:         BRITTNEY SLADE               Room:          [address] [place] 
[facility]#:    [id number]     Account #:     [id number]  
Admission:    18    Attend Phys:   Reno Lutz
Discharge:                Date of Birth: [birth year]  
Date of Service: 18 1440  Report #:      [id number]
        [id number]
_______________________________________________________________________________
 
Great Vessels
The aortic root is normal in size. IVC is normal in size and 
collapses with &gt;50% inspiration
 
Pericardium
There is no pericardial effusion.
 
&lt;Conclusion&gt;
LVEF is 60-65%.
Moderate concentric left ventricular hypertrophy.
 
 
 
 
 
 
 
 
 
 
 
 
 
 
 
 
 
 
 
 
 
 
 
 
 
 
 
 
 
 
 
 
 
  <ELECTRONICALLY SIGNED>
                                           By: Gilbert Cortez MD, FACC      
  18     1440
D: 18 144   _____________________________________
T: 18 144   Gilbert Cortez MD, FACC        /INF

## 2018-04-04 NOTE — NUR
RECIEVED REPORT AND ASSUMED CARE OF PT AT 1930. PT SITTING ON BEDSIDE
COMMODE. PT'S O2 OFF, SAT 78%. ATTACHED EXTENSION TUBING AND REAPPLIED O2. SAT
NOW 92%. CHANGED PT'S LINENS ON BED. CALL LIGHT IN REACH, PT INSTRUCTED TO
CALL WHEN GETTING UP.

## 2018-04-04 NOTE — NUR
PT RESTLESS AT TIMES AND BECOMES MILDLY AGITATED AT ALARMS ON UNIT. PT C/O
JARAMILLO AND WANTED IT REMOVED. JARAMILLO REMOVED AND PT ABLE TO URINATE IN BSC. PT
HAS C/O HEADACHE ALL DAY REGARDLESS OF PAIN MED GIVEN. PT CURRENTLY APPEARS
COMFORTABLE, LAYING IN BED WITH EYES CLOSED. PT ABLE TO MAKE NEEDS KNOWN, CALL
LIGHT IN REACH

## 2018-04-05 VITALS — DIASTOLIC BLOOD PRESSURE: 66 MMHG | SYSTOLIC BLOOD PRESSURE: 126 MMHG

## 2018-04-05 VITALS — SYSTOLIC BLOOD PRESSURE: 110 MMHG | DIASTOLIC BLOOD PRESSURE: 56 MMHG

## 2018-04-05 VITALS — DIASTOLIC BLOOD PRESSURE: 59 MMHG | SYSTOLIC BLOOD PRESSURE: 155 MMHG

## 2018-04-05 VITALS — DIASTOLIC BLOOD PRESSURE: 67 MMHG | SYSTOLIC BLOOD PRESSURE: 100 MMHG

## 2018-04-05 VITALS — DIASTOLIC BLOOD PRESSURE: 83 MMHG | SYSTOLIC BLOOD PRESSURE: 148 MMHG

## 2018-04-05 VITALS — SYSTOLIC BLOOD PRESSURE: 127 MMHG | DIASTOLIC BLOOD PRESSURE: 82 MMHG

## 2018-04-05 VITALS — DIASTOLIC BLOOD PRESSURE: 96 MMHG | SYSTOLIC BLOOD PRESSURE: 123 MMHG

## 2018-04-05 VITALS — SYSTOLIC BLOOD PRESSURE: 95 MMHG | DIASTOLIC BLOOD PRESSURE: 54 MMHG

## 2018-04-05 VITALS — SYSTOLIC BLOOD PRESSURE: 105 MMHG | DIASTOLIC BLOOD PRESSURE: 47 MMHG

## 2018-04-05 VITALS — SYSTOLIC BLOOD PRESSURE: 106 MMHG | DIASTOLIC BLOOD PRESSURE: 51 MMHG

## 2018-04-05 VITALS — DIASTOLIC BLOOD PRESSURE: 66 MMHG | SYSTOLIC BLOOD PRESSURE: 115 MMHG

## 2018-04-05 LAB
ABSOLUTE BASOPHILS: 0.1 THOU/UL (ref 0–0.2)
ABSOLUTE EOSINOPHILS: 0.3 THOU/UL (ref 0–0.7)
ABSOLUTE MONOCYTES: 1.1 THOU/UL (ref 0–1.2)
ALBUMIN SERPL-MCNC: 2.1 G/DL (ref 3.4–5)
ALP SERPL-CCNC: 55 U/L (ref 46–116)
ALT SERPL-CCNC: 78 U/L (ref 30–65)
ANION GAP SERPL CALC-SCNC: 6 MMOL/L (ref 7–16)
ANION GAP SERPL CALC-SCNC: 8 MMOL/L (ref 7–16)
AST SERPL-CCNC: 101 U/L (ref 15–37)
BASOPHILS NFR BLD AUTO: 1.1 %
BILIRUB SERPL-MCNC: 0.2 MG/DL
BUN SERPL-MCNC: 23 MG/DL (ref 7–18)
BUN SERPL-MCNC: 25 MG/DL (ref 7–18)
CALCIUM SERPL-MCNC: 8.4 MG/DL (ref 8.5–10.1)
CALCIUM SERPL-MCNC: 8.5 MG/DL (ref 8.5–10.1)
CHLORIDE SERPL-SCNC: 101 MMOL/L (ref 98–107)
CHLORIDE SERPL-SCNC: 101 MMOL/L (ref 98–107)
CO2 SERPL-SCNC: 28 MMOL/L (ref 21–32)
CO2 SERPL-SCNC: 29 MMOL/L (ref 21–32)
CREAT SERPL-MCNC: 1.2 MG/DL (ref 0.6–1.3)
CREAT SERPL-MCNC: 1.5 MG/DL (ref 0.6–1.3)
EOSINOPHIL NFR BLD: 2.7 %
GLUCOSE SERPL-MCNC: 124 MG/DL (ref 70–99)
GLUCOSE SERPL-MCNC: 128 MG/DL (ref 70–99)
GRANULOCYTES NFR BLD MANUAL: 69.5 %
HCT VFR BLD CALC: 29.1 % (ref 37–47)
HGB BLD-MCNC: 8.9 GM/DL (ref 12–15)
LYMPHOCYTES # BLD: 1.6 THOU/UL (ref 0.8–5.3)
LYMPHOCYTES NFR BLD AUTO: 15.7 %
MCH RBC QN AUTO: 22.5 PG (ref 26–34)
MCHC RBC AUTO-ENTMCNC: 30.7 G/DL (ref 28–37)
MCV RBC: 73.4 FL (ref 80–100)
MONOCYTES NFR BLD: 11 %
MPV: 8 FL. (ref 7.2–11.1)
NEUTROPHILS # BLD: 7.1 THOU/UL (ref 1.6–8.1)
NUCLEATED RBCS: 0 /100WBC
PLATELET COUNT*: 290 THOU/UL (ref 150–400)
POTASSIUM SERPL-SCNC: 3.8 MMOL/L (ref 3.5–5.1)
POTASSIUM SERPL-SCNC: 3.8 MMOL/L (ref 3.5–5.1)
PROT SERPL-MCNC: 6.6 G/DL (ref 6.4–8.2)
RBC # BLD AUTO: 3.97 MIL/UL (ref 4.2–5)
RDW-CV: 17.6 % (ref 10.5–14.5)
SODIUM SERPL-SCNC: 136 MMOL/L (ref 136–145)
SODIUM SERPL-SCNC: 137 MMOL/L (ref 136–145)
WBC # BLD AUTO: 10.3 THOU/UL (ref 4–11)

## 2018-04-05 NOTE — CON
[address]  [address]                    CONSULTATION                  
_______________________________________________________________________________
 
Name:       BRITTNEY SLADE JOVI                Room:           [address] [place]  
M.R.#:  [id number]      Account #:      [id number]  
Admission:  04/02/18     Attend Phys:    INEZ Zuniga
Discharge:               Date of Birth:  [birth year]  
         Report #: [id number]
                                                                     [id number]  
_______________________________________________________________________________
THIS REPORT FOR:  //name//                      
 
CC: Gilbert Lutz
 
DATE OF SERVICE:  04/03/2018
 
 
REQUESTING PHYSICIAN:  Reno Lutz DO
 
REASON FOR CONSULTATION:  Acute kidney injury.
 
HISTORY OF PRESENT ILLNESS:  The patient is a 58-year-old white female with
medical history significant for morbid obesity, diabetes mellitus type 2, and
chronic kidney disease stage 3.  She presents to the hospital with complaints of
not feeling well and shortness of breath.  The patient states that the symptoms
started about 10 days ago.  She was seen by Dr. Candelario and she was advised to
go to the Emergency Room; however, she postponed that admission until she got
really worse.  When she came to the hospital, she was diagnosed with pneumonia
and sepsis, and was started on sepsis protocol.  She was hypotensive.  She was
given 4 liters of fluids.  Her creatinine initially was 3.4 and that has
improved to 2.8 today.  She had a chest x-ray done that revealed cardiomegaly,
elevation of the right hemidiaphragm, and mild patchy bibasilar
atelectasis/pneumonitis.
 
Her white count was elevated at 16,300 with 92% segs on the differential.
 
PAST MEDICAL HISTORY:
1.  Morbid obesity.
2.  Diabetes mellitus type 2.
3.  Cardiomyopathy.
4.  Chronic kidney disease stage 3.
 
FAMILY HISTORY:  Noncontributory.
 
SOCIAL HISTORY:  No tobacco or alcohol abuse.
 
MEDICATIONS PRIOR TO ADMISSION:  Reviewed.  From my standpoint, she was on
hydrochlorothiazide 25 mg a day, losartan 50 mg a day, allopurinol 100 mg a day,
and metformin.
 
REVIEW OF SYSTEMS:  Positive for shortness of breath, weakness, and cough.
 
Denies changes in visual acuity or hearing acuity.  Denies being depressed.  She
states her diabetes is controlled.
 
 
 
 
[place] [address]                    CONSULTATION                  
_______________________________________________________________________________
 
Name:       BRITTNEY SLADE                Room:           [address]#:  [id number]      Account #:      [id number]  
Admission:  04/02/18     Attend Phys:    INEZ Zuniga
Discharge:               Date of Birth:  [birth year]  
         Report #: [id number]
                                                                     [id number]  
_______________________________________________________________________________
PHYSICAL EXAMINATION:
GENERAL:  She is awake, alert, and oriented.  She is morbidly obese.
VITAL SIGNS:  Her blood pressure 155/77, but it was as high as 212/71 earlier,
but prior to that on admission, it was as low as 77/41.
HEENT:  Pupils are round.
NECK:  Fatty.
LUNGS:  Decreased air movement.
CARDIOVASCULAR:  No rub.
ABDOMEN:  Very obese.
LOWER EXTREMITIES:  Trace edema.
 
LABORATORY REPORT:  As I mentioned earlier.
 
ASSESSMENT:  A 58-year-old female admitted with respiratory distress, diagnosed
with pneumonitis.  She was hypotensive. She was given fluids and antibiotics. 
She feels better now.  She developed acute kidney injury due to under-perfusion
of the kidney due to the septic process due to pneumonitis.
 
PLAN:  Overall, she is somewhat better.  Renal functions are improving.  She
states that she feels better.  She has good p.o. intake.  She is off of
pressors.  We will continue to monitor.  Continue oral hydration and
antibiotics.
 
Thank you very much for asking my opinion on acute kidney injury on the patient.
 We will follow with you closely.
 
 
 
 
 
 
 
 
 
 
 
 
 
 
 
 
 
 
 
<ELECTRONICALLY SIGNED>
                                        By:  Aaron Calvo MD      
04/05/18     1528
D: 04/03/18 1559_______________________________________
T: 04/04/18 0353Alexsherie Calvo MD         /nt

## 2018-04-05 NOTE — EKG
[place] [address]
Phone:  [phone]                     ELECTROCARDIOGRAM REPORT      
_______________________________________________________________________________
 
Name:       BRITTNEY SLADE                Room:           [address]    ADM IN  
M.R.#:  [id number]      Account #:      [id number]  
Admission:  18     Attend Phys:    INEZ Zuniga
Discharge:               Date of Birth:  [birth year]  
         Report #: [id number]
    [id number]
_______________________________________________________________________________
THIS REPORT FOR:  //name//                      
 
                          [facility]
                                       
Test Date:    2018               Test Time:    21:38:45
Pat Name:     BRITTNEY SLADE            Department:   
Patient ID:   [id number]            Room:         [address]
Gender:       F                        Technician:   MR
:          1960               Requested By: Reno Lutz
Order Number: [id number]    Anastacia MD:   Siddhartha French
                                 Measurements
Intervals                              Axis          
Rate:         91                       P:            54
NJ:           155                      QRS:          18
QRSD:         110                      T:            6
QT:           370                                    
QTc:          456                                    
                           Interpretive Statements
Sinus rhythm
Baseline wander in lead(s) V2,V3
Compared to ECG 2018 09:52:17
Sinus tachycardia no longer present
 
Electronically Signed On 2018 16:35:48 CDT by Siddhartha French
[email or web address]
 
 
 
 
 
 
 
 
 
 
 
 
 
 
 
 
 
 
  <ELECTRONICALLY SIGNED>
                                           By: Siddhartha French MD, [facility]   
  18     1635
D: 18 2138   _____________________________________
T: 18   Siddhartha French MD, [facility]     /EPI

## 2018-04-05 NOTE — NUR
PT RESTLESS AND IMPUSIVE DURING NIGHT. PT FREQUENTLY TAKING OFF ALL MONITOR
LEADS AND SAT MONITOR. PT ALSO REMOVES O2 WHEN RESTLESS. PT ALERT AND ORIENTED
X4 DURING EPISODES AND DOES NOT RING CALL LIGHT WHEN NEEDING ASSISTANCE. PT
PROGRESSING TOWARD GOALS. HEART RATE < 100, BLOOD PRESSURE WITHIN NRMAL
LIMITS. O2 SAT 95 -97% WITH O2 ON.

## 2018-04-05 NOTE — NUR
PT DOING WELL. PT HAS HAD 2 EPISODES OF BLADDER INC IN BED. NOTED THAT PT WSAS
GETTING UP TO BSC WITH SB ASSIT. SISTER IN ROOM PT HAD NEED ASSIST TIMES 1 TO
BSC, PT IS A POOR EATER. IVAB GIVEN EARLIER IN SHIFT. PT XANAX RESUMMED. PT
HAS HAD NO EPISODES OF SVT THIS SHIFT. PT IS PROGRESSING TOWARDS DISCHARGE
GOALS.

## 2018-04-05 NOTE — NUR
PT KEEPS REMOVING TELEMETRY WIRES, O2 SENSOR, AND BP CUFF.PICKED UP SEVERAL
PAIRS OF WET UNDERPANTS OFF FLOOR. PERSONAL BELONGINGS SCATTERED OVER BEDSIDE
TRAY AND FLOOR. PT HAD REMOVED ARM BAND.PT REMINDED NOT TO TAKE OFF NAME BAND
DURING HOSPITAL STAY. PT STATES ALLL THESE NOISES ARE MAKING HER CRAZY. WILL
APPROACH PRIMARY DR TO SEE IF PT MEETS CRITERIA FOR DOWN GRADE TO NEXT LEVEL
OF CARE.

## 2018-04-06 VITALS — SYSTOLIC BLOOD PRESSURE: 121 MMHG | DIASTOLIC BLOOD PRESSURE: 59 MMHG

## 2018-04-06 VITALS — SYSTOLIC BLOOD PRESSURE: 128 MMHG | DIASTOLIC BLOOD PRESSURE: 72 MMHG

## 2018-04-06 VITALS — DIASTOLIC BLOOD PRESSURE: 54 MMHG | SYSTOLIC BLOOD PRESSURE: 128 MMHG

## 2018-04-06 VITALS — SYSTOLIC BLOOD PRESSURE: 128 MMHG | DIASTOLIC BLOOD PRESSURE: 54 MMHG

## 2018-04-06 LAB
%HYPO/RBC NFR BLD AUTO: (no result) %
ABSOLUTE BASOPHILS: 0.1 THOU/UL (ref 0–0.2)
ABSOLUTE EOSINOPHILS: 0.4 THOU/UL (ref 0–0.7)
ABSOLUTE MONOCYTES: 0.5 THOU/UL (ref 0–1.2)
ALBUMIN SERPL-MCNC: 2.1 G/DL (ref 3.4–5)
ALP SERPL-CCNC: 53 U/L (ref 46–116)
ALT SERPL-CCNC: 75 U/L (ref 30–65)
ANION GAP SERPL CALC-SCNC: 7 MMOL/L (ref 7–16)
ANISOCYTOSIS BLD QL SMEAR: (no result)
AST SERPL-CCNC: 76 U/L (ref 15–37)
BASOPHILS NFR BLD AUTO: 1 %
BILIRUB SERPL-MCNC: 0.2 MG/DL
BUN SERPL-MCNC: 18 MG/DL (ref 7–18)
CALCIUM SERPL-MCNC: 8.8 MG/DL (ref 8.5–10.1)
CHLORIDE SERPL-SCNC: 104 MMOL/L (ref 98–107)
CO2 SERPL-SCNC: 31 MMOL/L (ref 21–32)
CREAT SERPL-MCNC: 1.1 MG/DL (ref 0.6–1.3)
EOSINOPHIL NFR BLD: 4 %
GLUCOSE SERPL-MCNC: 106 MG/DL (ref 70–99)
GRANULOCYTES NFR BLD MANUAL: 61 %
HCT VFR BLD CALC: 29.2 % (ref 37–47)
HGB BLD-MCNC: 9.1 GM/DL (ref 12–15)
LYMPHOCYTES # BLD: 2.1 THOU/UL (ref 0.8–5.3)
LYMPHOCYTES NFR BLD AUTO: 19 %
MCH RBC QN AUTO: 22.7 PG (ref 26–34)
MCHC RBC AUTO-ENTMCNC: 31 G/DL (ref 28–37)
MCV RBC: 73.1 FL (ref 80–100)
METAMYELOCYTES NFR BLD: 2 %
MICROCYTES: (no result)
MONOCYTES NFR BLD: 5 %
MPV: 8 FL. (ref 7.2–11.1)
NEUTROPHILS # BLD: 7.7 THOU/UL (ref 1.6–8.1)
NEUTS BAND NFR BLD: 8 %
NUCLEATED RBCS: 0 /100WBC
PLATELET COUNT*: 375 THOU/UL (ref 150–400)
POTASSIUM SERPL-SCNC: 3.8 MMOL/L (ref 3.5–5.1)
PROT SERPL-MCNC: 6.3 G/DL (ref 6.4–8.2)
RBC # BLD AUTO: 3.99 MIL/UL (ref 4.2–5)
RDW-CV: 17.6 % (ref 10.5–14.5)
SODIUM SERPL-SCNC: 142 MMOL/L (ref 136–145)
WBC # BLD AUTO: 10.9 THOU/UL (ref 4–11)

## 2018-04-06 NOTE — NUR
RECEIVED DISCHARGE ORDERS PER DR MADISON. PERIPHERAL IV DISCONTINUED. NEW
SCRIPTS GIVEN WITH MEDICATION INFORMATION SHEETS. EDUCATED PT ON F/U APPT WITH
HER PRIMARY DR AND HOME MEDICATIONS. PATIENTS COUSIN TRANSPORTING PATIENT
HOME, SHE BROUGHT CLOTHES FOR PATIENT AND HELPED THE PATIENT GET DRESSED.
PATIENT LIVES AT HOME WITH HER COUSIN. EDUCATED PT ON SIGNS AND SYMPTOMS TO
MONITOR POST DC. PATIENT VERBALIZED UNDERSTANDING. SHE DENIES ANY QUESTIONS OR
CONCERNS. SHE IS LEAVING VIA WHEELCHAIR ACCOMPANIED BY NURSING STAFF AND HER
PATIENT'S COUSIN. ALL BELONGINGS ARE PACKED AND LEAVING WITH PATIENT.

## 2018-04-06 NOTE — NUR
Spoke with Pt and family in room. Requesting a wc and commode at [facility]. Waiting
to hear back from Karena at [facility] to determine if Pt is eligible. Family also
requesting HH for Pt at [facility], Pt wants to use [facility], nursing, PT/OT, aide. CM
[facility] fax dc [facility] once available.
[facility]
p:[phone]
f:[phone]

## 2018-04-06 NOTE — NUR
RECEIVED REPORT FROM SHAHLA HENDERSON AROUND 1950. BROUGHT PT AND BELONGINS UP 
VIA BED. PT A/OX2-3, PLEASANT AT FIRST AND LAUGHING WITH STAFF. PT LATER
BECAME FRUSTRATED AND WAS YELLING AT STAFF STATING SHE WAS FEELING ANXIOUS
(AFTER PO XANAX HAD BEEN GIVEN) BECAUSE OF "ALL THE WIRES". PT SEEMED TO BE
TALKING ABOUT THE TELE MONITOR AND LEFT IJ. PT HAD REMOVED TELE PACK AND WAS
SITTING ON THE EDGE OF THE BED ASKING TO GET DRESSED. WHEN ASKED WHAT SHE
WANTED TO DO OR WHAT SHE NEEDED PT WOULDN'T RESPOND AND WOULD THEN BECOME
FRUSTRATED. PT SAID SHE WAS UPSET HER COUSIN DIDN'T COME UP TO SEE HER TONIGHT
AND ASKED FOR A PHONE. THIS RN ASKED IF SHE HAD ONE IN HER BAG AND PT
YELLED,"IF I HAD ONE, I WOULD BE USING IT WOULDN'T I? 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
REGISTERED NURSE MY WHOLE LIFE." CHARGE NURSE AND OTHER STAFF RAN INTO ROOM.
PT SEEMED TO CALM DOWN SOME BUT REFUSED TO HAVE BEDALARM IN PLACE. EDUCATION
DONE ON SAFETY AND HOSPITAL PROTOCOLS. SECURITY NOTIFIED ABOUT OUTBURST. PT
REFUSED TO WEAR TELE MONITOR REST OF SHIFT. AROUND 0300, PT CALLED OUT STATING
SHE HAD BLOOD ALL OVER. PT TOLD THIS RN SHE HAD PULLED OUT HER LINE AND SAID
"I TOLD YOU I WOULDN'T BE HAPPY UNLESS THE CORDS WERE GONE." PIV IN PLACE IN
RIGHT AC. PT UP SELF WITH WALKER, EDUCATED TO CALL STAFF WHEN GOING TO BR BUT
PT DOES NOT. ON ROOM AIR. VSS.  TELE HAD BEEN TRACING SR/PVC'S. SEE OTHER
CHARTING. CALL LIGHT IN REACH, WILL CONTINUE WITH PLAN OF CARE.

## 2018-04-06 NOTE — NUR
RECEIVED PT CARE 0700. PT IS ALERT AND ORIENTED X4. VSS. REFUSING TO WEAR THE
CARDIAC MONITOR. SHE DENIES ANY SOA. O2 SAT 97% ON ROOM AIR. PATIENT
FRUSTRATED THAT HER COUSIN TOOK HER CELL PHONE HOME AND SHE HAS NO PHONE
NUMBERS TO CONTACT ANYONE. WE WERE ABLE TO CALL HER COUSIN AND TRANSFER THE
CALL INTO THE PATIENTS ROOM TO HELP EASE SOME OF HER ANXIETY THIS AM. AM
ASSESSMENT CHARTED. MEDS PER MAR. PLANNING FOR DC TO HOME THIS AFTERNOON.
OCCUPATIONAL THERAPY IN TO WORK WITH PATIENT THIS AM. SHE IS REFUSING ALL FALL
PRECAUTIONS AT THIS TIME. EDUCATION GIVEN. WILL CONTINUE TO MONITOR.

## 2019-12-06 ENCOUNTER — HOSPITAL ENCOUNTER (EMERGENCY)
Dept: HOSPITAL 96 - M.ERS | Age: 59
Discharge: HOME | End: 2019-12-06
Payer: COMMERCIAL

## 2019-12-06 VITALS — BODY MASS INDEX: 41.95 KG/M2 | WEIGHT: 293 LBS | HEIGHT: 70 IN

## 2019-12-06 VITALS — SYSTOLIC BLOOD PRESSURE: 98 MMHG | DIASTOLIC BLOOD PRESSURE: 49 MMHG

## 2019-12-06 DIAGNOSIS — R53.1: Primary | ICD-10-CM

## 2019-12-06 DIAGNOSIS — Z91.048: ICD-10-CM

## 2019-12-06 DIAGNOSIS — E03.9: ICD-10-CM

## 2019-12-06 DIAGNOSIS — J45.909: ICD-10-CM

## 2019-12-06 DIAGNOSIS — F31.9: ICD-10-CM

## 2019-12-06 DIAGNOSIS — M79.7: ICD-10-CM

## 2019-12-06 DIAGNOSIS — J32.9: ICD-10-CM

## 2019-12-06 DIAGNOSIS — F41.9: ICD-10-CM

## 2019-12-06 DIAGNOSIS — Z90.89: ICD-10-CM

## 2019-12-06 DIAGNOSIS — Z88.2: ICD-10-CM

## 2019-12-06 DIAGNOSIS — Z86.018: ICD-10-CM

## 2019-12-06 DIAGNOSIS — I10: ICD-10-CM

## 2019-12-06 DIAGNOSIS — E11.9: ICD-10-CM

## 2019-12-06 DIAGNOSIS — Z98.84: ICD-10-CM

## 2019-12-06 LAB
%HYPO/RBC NFR BLD AUTO: (no result) %
ABSOLUTE BASOPHILS: 0.1 THOU/UL (ref 0–0.2)
ABSOLUTE EOSINOPHILS: 0.1 THOU/UL (ref 0–0.7)
ABSOLUTE MONOCYTES: 0.5 THOU/UL (ref 0–1.2)
ALBUMIN SERPL-MCNC: 3.3 G/DL (ref 3.4–5)
ALP SERPL-CCNC: 58 U/L (ref 46–116)
ALT SERPL-CCNC: 66 U/L (ref 30–65)
ANION GAP SERPL CALC-SCNC: 11 MMOL/L (ref 7–16)
ANISOCYTOSIS BLD QL SMEAR: (no result)
APTT BLD: 28.5 SECONDS (ref 25–31.3)
AST SERPL-CCNC: 71 U/L (ref 15–37)
BASOPHILS NFR BLD AUTO: 0.7 %
BILIRUB SERPL-MCNC: 0.4 MG/DL
BUN SERPL-MCNC: 34 MG/DL (ref 7–18)
CALCIUM SERPL-MCNC: 8.6 MG/DL (ref 8.5–10.1)
CHLORIDE SERPL-SCNC: 90 MMOL/L (ref 98–107)
CO2 SERPL-SCNC: 29 MMOL/L (ref 21–32)
CREAT SERPL-MCNC: 1.9 MG/DL (ref 0.6–1.3)
EOSINOPHIL NFR BLD: 1.6 %
GLUCOSE SERPL-MCNC: 104 MG/DL (ref 70–99)
GRANULOCYTES NFR BLD MANUAL: 77 %
HCT VFR BLD CALC: 29.9 % (ref 37–47)
HGB BLD-MCNC: 9.3 GM/DL (ref 12–15)
INR PPP: 1
LYMPHOCYTES # BLD: 1 THOU/UL (ref 0.8–5.3)
LYMPHOCYTES NFR BLD AUTO: 14.3 %
MCH RBC QN AUTO: 20.9 PG (ref 26–34)
MCHC RBC AUTO-ENTMCNC: 31.1 G/DL (ref 28–37)
MCV RBC: 67.2 FL (ref 80–100)
MICROCYTES: (no result)
MONOCYTES NFR BLD: 6.4 %
MPV: 7.3 FL. (ref 7.2–11.1)
NEUTROPHILS # BLD: 5.6 THOU/UL (ref 1.6–8.1)
NT-PRO BRAIN NAT PEPTIDE: 944 PG/ML (ref ?–300)
NUCLEATED RBCS: 0 /100WBC
PLATELET # BLD EST: (no result) 10*3/UL
PLATELET COUNT*: 429 THOU/UL (ref 150–400)
POTASSIUM SERPL-SCNC: 4.3 MMOL/L (ref 3.5–5.1)
PROT SERPL-MCNC: 7.3 G/DL (ref 6.4–8.2)
PROTHROMBIN TIME: 10.7 SECONDS (ref 9.2–11.5)
RBC # BLD AUTO: 4.45 MIL/UL (ref 4.2–5)
RDW-CV: 18.4 % (ref 10.5–14.5)
SODIUM SERPL-SCNC: 130 MMOL/L (ref 136–145)
WBC # BLD AUTO: 7.2 THOU/UL (ref 4–11)

## 2019-12-06 NOTE — EKG
[place] [address]
Phone:  [phone]                     ELECTROCARDIOGRAM REPORT      
_______________________________________________________________________________
 
Name:       BRITTNEY SLADE                Room:                      [facility]#:  [id number]      Account #:      [id number]  
Admission:  19     Attend Phys:                         
Discharge:  19     Date of Birth:  [birth year]  
         Report #: [id number]
    [id number]
_______________________________________________________________________________
THIS REPORT FOR:  //name//                      
 
                         [facility] ED
                                       
Test Date:    2019               Test Time:    13:31:55
Pat Name:     BRITTNEY SLADE            Department:   
Patient ID:   [id number]            Room:          
Gender:       F                        Technician:   
:          1960               Requested By: Mark Morales
Order Number: [id number] MD:   Siddhartha French
                                 Measurements
Intervals                              Axis          
Rate:         79                       P:            7
KS:           157                      QRS:          19
QRSD:         113                      T:            5
QT:           416                                    
QTc:          477                                    
                           Interpretive Statements
Sinus rhythm
Borderline low voltage, extremity leads
Compared to ECG 2018 21:38:45
No significant changes
 
Electronically Signed On 2019 19:57:17 CST by Siddhartha French
[email or web address]
 
 
 
 
 
 
 
 
 
 
 
 
 
 
 
 
 
 
  <ELECTRONICALLY SIGNED>
                                           By: Siddhartha French MD, [facility]   
  19
D: 19 1331   _____________________________________
T: 19 1331   Siddhartha French MD, [facility]     /EPI